# Patient Record
Sex: FEMALE | Race: WHITE | NOT HISPANIC OR LATINO | Employment: FULL TIME | URBAN - NONMETROPOLITAN AREA
[De-identification: names, ages, dates, MRNs, and addresses within clinical notes are randomized per-mention and may not be internally consistent; named-entity substitution may affect disease eponyms.]

---

## 2020-08-13 ENCOUNTER — TELEPHONE (OUTPATIENT)
Dept: OBSTETRICS AND GYNECOLOGY | Facility: CLINIC | Age: 70
End: 2020-08-13

## 2020-08-13 ENCOUNTER — PROCEDURE VISIT (OUTPATIENT)
Dept: OBSTETRICS AND GYNECOLOGY | Facility: CLINIC | Age: 70
End: 2020-08-13

## 2020-08-13 VITALS
BODY MASS INDEX: 29.06 KG/M2 | WEIGHT: 164 LBS | HEIGHT: 63 IN | SYSTOLIC BLOOD PRESSURE: 138 MMHG | DIASTOLIC BLOOD PRESSURE: 82 MMHG

## 2020-08-13 DIAGNOSIS — Z12.11 SCREEN FOR COLON CANCER: ICD-10-CM

## 2020-08-13 DIAGNOSIS — Z01.419 ENCOUNTER FOR GYNECOLOGICAL EXAMINATION WITHOUT ABNORMAL FINDING: Primary | ICD-10-CM

## 2020-08-13 DIAGNOSIS — N95.1 MENOPAUSAL STATE: ICD-10-CM

## 2020-08-13 DIAGNOSIS — Z12.31 ENCOUNTER FOR SCREENING MAMMOGRAM FOR MALIGNANT NEOPLASM OF BREAST: ICD-10-CM

## 2020-08-13 DIAGNOSIS — Z13.820 ENCOUNTER FOR SCREENING FOR OSTEOPOROSIS: ICD-10-CM

## 2020-08-13 PROCEDURE — 99387 INIT PM E/M NEW PAT 65+ YRS: CPT | Performed by: NURSE PRACTITIONER

## 2020-08-13 RX ORDER — ALENDRONATE SODIUM 70 MG/1
70 TABLET ORAL
Qty: 4 TABLET | Refills: 4 | Status: SHIPPED | OUTPATIENT
Start: 2020-08-13 | End: 2020-12-17

## 2020-08-13 NOTE — TELEPHONE ENCOUNTER
----- Message from MAXIMO Joy sent at 8/13/2020  1:02 PM CDT -----  Spine and hips are very close to osteoporosis. She needs to start on Fosamax 35mg once per week for prevention. This may cause heartburn so she needs to take it first thing in the morning (separate from all other medications and supplements) and stay sitting upright for at least 30 minutes. Report any bone pain immediately. Also needs to start on an OTC Calcium + vitamin D supplement daily. Next DEXA will be due in 2 years.

## 2020-08-14 PROBLEM — N95.1 MENOPAUSAL STATE: Status: ACTIVE | Noted: 2020-08-14

## 2020-08-14 NOTE — PROGRESS NOTES
Subjective   Cora Burton is a 69 y.o. presents for GYN exam. No complaints or concerns at this time.     LMP ~ 2005; denies any hx of PMB  Last pap- ; no hx of abnormal  Last mammo- 2019; no hx of abnormal  Last colonoscopy ~10 years ago, normal  Last DEXA-  normal    Gynecologic Exam   The patient's pertinent negatives include no genital itching, genital lesions, genital odor, genital rash, pelvic pain, vaginal bleeding or vaginal discharge. Pertinent negatives include no abdominal pain, constipation, diarrhea, dysuria, nausea or vomiting. She is not sexually active. She uses nothing for contraception. She is postmenopausal. There is no history of an abdominal surgery, a  section, an ectopic pregnancy, endometriosis, a gynecological surgery, herpes simplex, menorrhagia, metrorrhagia, miscarriage, ovarian cysts, perineal abscess, PID, an STD, a terminated pregnancy or vaginosis.       The following portions of the patient's history were reviewed and updated as appropriate: allergies, current medications, past family history, past medical history, past social history, past surgical history and problem list.    Review of Systems   Constitutional: Negative for activity change, appetite change, diaphoresis, fatigue, unexpected weight gain and unexpected weight loss.   Respiratory: Negative for chest tightness and shortness of breath.    Cardiovascular: Negative for chest pain and palpitations.   Gastrointestinal: Negative for abdominal distention, abdominal pain, constipation, diarrhea, nausea, rectal pain, vomiting, GERD and indigestion.   Endocrine: Negative.    Genitourinary: Negative for breast discharge, breast lump, breast pain, decreased urine volume, dysuria, menorrhagia, pelvic pain, pelvic pressure, urinary incontinence, vaginal bleeding, vaginal discharge and vaginal pain.   Musculoskeletal: Negative for myalgias.   Skin: Negative for color change, dry skin and skin lesions.   Neurological:  Negative for light-headedness and headache.   Psychiatric/Behavioral: Negative for agitation, dysphoric mood, sleep disturbance, depressed mood and stress. The patient is not nervous/anxious.        Objective   Physical Exam   Constitutional: She is oriented to person, place, and time. She appears well-developed and well-nourished. No distress.   Neck: No thyroid mass and no thyromegaly present.   Cardiovascular: Normal rate, regular rhythm and normal heart sounds.   Pulmonary/Chest: Effort normal and breath sounds normal. Right breast exhibits no inverted nipple, no mass, no nipple discharge, no skin change and no tenderness. Left breast exhibits no inverted nipple, no mass, no nipple discharge, no skin change and no tenderness. No breast swelling or bleeding. Breasts are symmetrical.   Genitourinary: No breast swelling or bleeding.   Genitourinary Comments: Pap smear not indicated. No GYN complaints. Pelvic exam deferred.    Neurological: She is alert and oriented to person, place, and time.   Skin: Skin is warm, dry and intact. She is not diaphoretic.   Psychiatric: She has a normal mood and affect. Her behavior is normal.   Nursing note and vitals reviewed.        Assessment/Plan   Cora was seen today for gynecologic exam.    Diagnoses and all orders for this visit:    Encounter for gynecological examination without abnormal finding    Encounter for screening mammogram for malignant neoplasm of breast  -     Mammo Screening Digital Tomosynthesis Bilateral With CAD    Encounter for screening for osteoporosis  -     DEXA Bone Density Axial; Future    Menopausal state  -     Ambulatory Referral to Gastroenterology    Screen for colon cancer  -     Ambulatory Referral to Gastroenterology      Pt no longer needs routine cervical cancer screening; we discussed this today and she is content with this information. Screening mammogram today. Pt to see GI for colonoscopy at next available time.

## 2020-08-27 ENCOUNTER — OFFICE VISIT (OUTPATIENT)
Dept: GASTROENTEROLOGY | Facility: CLINIC | Age: 70
End: 2020-08-27

## 2020-08-27 VITALS
HEIGHT: 63 IN | SYSTOLIC BLOOD PRESSURE: 155 MMHG | HEART RATE: 83 BPM | BODY MASS INDEX: 29.2 KG/M2 | WEIGHT: 164.8 LBS | DIASTOLIC BLOOD PRESSURE: 76 MMHG

## 2020-08-27 DIAGNOSIS — Z12.11 ENCOUNTER FOR SCREENING FOR MALIGNANT NEOPLASM OF COLON: Primary | ICD-10-CM

## 2020-08-27 PROCEDURE — S0285 CNSLT BEFORE SCREEN COLONOSC: HCPCS | Performed by: NURSE PRACTITIONER

## 2020-08-27 RX ORDER — DEXTROSE AND SODIUM CHLORIDE 5; .45 G/100ML; G/100ML
30 INJECTION, SOLUTION INTRAVENOUS CONTINUOUS PRN
Status: CANCELLED | OUTPATIENT
Start: 2020-09-08

## 2020-08-27 RX ORDER — SODIUM, POTASSIUM,MAG SULFATES 17.5-3.13G
1 SOLUTION, RECONSTITUTED, ORAL ORAL EVERY 12 HOURS
Qty: 2 BOTTLE | Refills: 0 | Status: SHIPPED | OUTPATIENT
Start: 2020-08-27 | End: 2020-09-14

## 2020-08-27 NOTE — PROGRESS NOTES
Chief Complaint   Patient presents with   • Colon Cancer Screening       Subjective    Cora Burton is a 69 y.o. female. she is here today for follow-up.    History of Present Illness  69-year-old female presents to discuss screening colonoscopy.  She denies any abdominal pain, nausea, vomiting or change within her bowel habits.  Denies any melena or hematochezia.  Weight is stable.  Previous colonoscopy in  per surgeon Dr. Bernard in [place] unable to review results she thinks she may have had polyps that she is unsure she denies any family history of colorectal cancer.       The following portions of the patient's history were reviewed and updated as appropriate:   No past medical history on file.  No past surgical history on file.  Family History   Problem Relation Age of Onset   • Breast cancer Maternal Aunt      OB History        3    Para   3    Term   3            AB        Living   3       SAB        TAB        Ectopic        Molar        Multiple        Live Births   3              Prior to Admission medications    Medication Sig Start Date End Date Taking? Authorizing Provider   alendronate (Fosamax) 70 MG tablet Take 1 tablet by mouth Every 7 (Seven) Days. 20   Pamela Gray, APRN     No Known Allergies  Social History     Socioeconomic History   • Marital status:      Spouse name: Not on file   • Number of children: Not on file   • Years of education: Not on file   • Highest education level: Not on file   Tobacco Use   • Smoking status: Never Smoker   • Smokeless tobacco: Never Used   Substance and Sexual Activity   • Alcohol use: Never     Frequency: Never   • Drug use: Never   • Sexual activity: Not Currently       Review of Systems  Review of Systems   Constitutional: Negative for activity change, appetite change, chills, diaphoresis, fatigue, fever and unexpected weight change.   HENT: Negative for sore throat and trouble swallowing.    Respiratory: Negative for  "shortness of breath.    Gastrointestinal: Negative for abdominal distention, abdominal pain, anal bleeding, blood in stool, constipation, diarrhea, nausea, rectal pain and vomiting.   Musculoskeletal: Negative for arthralgias.   Skin: Negative for pallor.   Neurological: Negative for light-headedness.        /76 (BP Location: Left arm)   Pulse 83   Ht 158.8 cm (62.5\")   Wt 74.8 kg (164 lb 12.8 oz)   BMI 29.66 kg/m²     Objective    Physical Exam   Constitutional: She is oriented to person, place, and time. She appears well-developed and well-nourished. She is cooperative. No distress.   HENT:   Head: Normocephalic and atraumatic.   Neck: Normal range of motion. Neck supple. No thyromegaly present.   Cardiovascular: Normal rate, regular rhythm and normal heart sounds.   Pulmonary/Chest: Effort normal and breath sounds normal. She has no wheezes. She has no rhonchi. She has no rales.   Abdominal: Soft. Normal appearance and bowel sounds are normal. She exhibits no shifting dullness, no distension, no fluid wave and no ascites. There is no hepatosplenomegaly. There is no tenderness. There is no rigidity and no guarding. No hernia.   Lymphadenopathy:     She has no cervical adenopathy.   Neurological: She is alert and oriented to person, place, and time.   Skin: Skin is warm, dry and intact. No rash noted. No pallor.   Psychiatric: She has a normal mood and affect. Her speech is normal.     No results found for any previous visit.     Assessment/Plan      1. Encounter for screening for malignant neoplasm of colon    .   Schedule patient for colonoscopy.  Follow-up after test return office sooner if needed    Orders placed during this encounter include:  Orders Placed This Encounter   Procedures   • Follow Anesthesia Guidelines / Standing Orders     Standing Status:   Future   • Obtain Informed Consent     Standing Status:   Future     Order Specific Question:   Informed Consent Given For     Answer:   " colonoscopy       COLONOSCOPY (N/A)    Review and/or summary of lab tests, radiology, procedures, medications. Review and summary of old records and obtaining of history. The risks and benefits of my recommendations, as well as other treatment options were discussed with the patient today. Questions were answered.    New Medications Ordered This Visit   Medications   • sodium-potassium-magnesium sulfates (Suprep Bowel Prep Kit) 17.5-3.13-1.6 GM/177ML solution oral solution     Sig: Take 1 bottle by mouth Every 12 (Twelve) Hours.     Dispense:  2 bottle     Refill:  0       Follow-up: Return in about 4 weeks (around 9/24/2020) for Recheck, After test.          This document has been electronically signed by MAXIMO Vazquez on August 27, 2020 14:53             No results found for this or any previous visit.

## 2020-08-28 ENCOUNTER — TRANSCRIBE ORDERS (OUTPATIENT)
Dept: GENERAL RADIOLOGY | Facility: HOSPITAL | Age: 70
End: 2020-08-28

## 2020-08-28 ENCOUNTER — OFFICE VISIT (OUTPATIENT)
Dept: SURGERY | Facility: CLINIC | Age: 70
End: 2020-08-28

## 2020-08-28 VITALS
HEIGHT: 63 IN | HEART RATE: 74 BPM | BODY MASS INDEX: 30.16 KG/M2 | SYSTOLIC BLOOD PRESSURE: 122 MMHG | WEIGHT: 170.2 LBS | DIASTOLIC BLOOD PRESSURE: 70 MMHG | TEMPERATURE: 97 F

## 2020-08-28 DIAGNOSIS — Z01.818 PRE-OP TESTING: Primary | ICD-10-CM

## 2020-08-28 DIAGNOSIS — R92.8 ABNORMAL MAMMOGRAM OF LEFT BREAST: Primary | ICD-10-CM

## 2020-08-28 PROCEDURE — 99203 OFFICE O/P NEW LOW 30 MIN: CPT | Performed by: SURGERY

## 2020-08-28 RX ORDER — OXYCODONE HYDROCHLORIDE AND ACETAMINOPHEN 5; 325 MG/1; MG/1
1 TABLET ORAL ONCE
Status: CANCELLED | OUTPATIENT
Start: 2020-08-28 | End: 2020-08-28

## 2020-08-28 RX ORDER — LIDOCAINE HYDROCHLORIDE AND EPINEPHRINE 10; 10 MG/ML; UG/ML
20 INJECTION, SOLUTION INFILTRATION; PERINEURAL ONCE
Status: CANCELLED | OUTPATIENT
Start: 2020-08-28 | End: 2020-08-28

## 2020-08-28 RX ORDER — DIAZEPAM 5 MG/1
5 TABLET ORAL ONCE
Status: CANCELLED | OUTPATIENT
Start: 2020-08-28 | End: 2020-08-28

## 2020-09-05 ENCOUNTER — LAB (OUTPATIENT)
Dept: LAB | Facility: HOSPITAL | Age: 70
End: 2020-09-05

## 2020-09-05 PROCEDURE — U0003 INFECTIOUS AGENT DETECTION BY NUCLEIC ACID (DNA OR RNA); SEVERE ACUTE RESPIRATORY SYNDROME CORONAVIRUS 2 (SARS-COV-2) (CORONAVIRUS DISEASE [COVID-19]), AMPLIFIED PROBE TECHNIQUE, MAKING USE OF HIGH THROUGHPUT TECHNOLOGIES AS DESCRIBED BY CMS-2020-01-R: HCPCS | Performed by: INTERNAL MEDICINE

## 2020-09-05 PROCEDURE — C9803 HOPD COVID-19 SPEC COLLECT: HCPCS

## 2020-09-05 PROCEDURE — C9803 HOPD COVID-19 SPEC COLLECT: HCPCS | Performed by: INTERNAL MEDICINE

## 2020-09-06 LAB
COVID LABCORP PRIORITY: NORMAL
SARS-COV-2 RNA RESP QL NAA+PROBE: NOT DETECTED

## 2020-09-08 ENCOUNTER — ANESTHESIA EVENT (OUTPATIENT)
Dept: GASTROENTEROLOGY | Facility: HOSPITAL | Age: 70
End: 2020-09-08

## 2020-09-08 ENCOUNTER — ANESTHESIA (OUTPATIENT)
Dept: GASTROENTEROLOGY | Facility: HOSPITAL | Age: 70
End: 2020-09-08

## 2020-09-08 ENCOUNTER — HOSPITAL ENCOUNTER (OUTPATIENT)
Facility: HOSPITAL | Age: 70
Setting detail: HOSPITAL OUTPATIENT SURGERY
Discharge: HOME | End: 2020-09-08
Attending: INTERNAL MEDICINE | Admitting: INTERNAL MEDICINE

## 2020-09-08 VITALS
HEIGHT: 62 IN | DIASTOLIC BLOOD PRESSURE: 70 MMHG | OXYGEN SATURATION: 99 % | BODY MASS INDEX: 30 KG/M2 | SYSTOLIC BLOOD PRESSURE: 127 MMHG | HEART RATE: 77 BPM | RESPIRATION RATE: 18 BRPM | WEIGHT: 163 LBS | TEMPERATURE: 97.1 F

## 2020-09-08 DIAGNOSIS — Z12.11 ENCOUNTER FOR SCREENING FOR MALIGNANT NEOPLASM OF COLON: ICD-10-CM

## 2020-09-08 PROCEDURE — 45378 DIAGNOSTIC COLONOSCOPY: CPT | Performed by: INTERNAL MEDICINE

## 2020-09-08 PROCEDURE — 25010000002 PROPOFOL 10 MG/ML EMULSION: Performed by: NURSE ANESTHETIST, CERTIFIED REGISTERED

## 2020-09-08 RX ORDER — LIDOCAINE HYDROCHLORIDE 20 MG/ML
INJECTION, SOLUTION INTRAVENOUS AS NEEDED
Status: DISCONTINUED | OUTPATIENT
Start: 2020-09-08 | End: 2020-09-08 | Stop reason: SURG

## 2020-09-08 RX ORDER — PROPOFOL 10 MG/ML
VIAL (ML) INTRAVENOUS AS NEEDED
Status: DISCONTINUED | OUTPATIENT
Start: 2020-09-08 | End: 2020-09-08 | Stop reason: SURG

## 2020-09-08 RX ORDER — DEXTROSE AND SODIUM CHLORIDE 5; .45 G/100ML; G/100ML
30 INJECTION, SOLUTION INTRAVENOUS CONTINUOUS PRN
Status: DISCONTINUED | OUTPATIENT
Start: 2020-09-08 | End: 2020-09-08 | Stop reason: HOSPADM

## 2020-09-08 RX ADMIN — DEXTROSE AND SODIUM CHLORIDE 30 ML/HR: 5; 450 INJECTION, SOLUTION INTRAVENOUS at 13:37

## 2020-09-08 RX ADMIN — PROPOFOL 100 MG: 10 INJECTION, EMULSION INTRAVENOUS at 14:38

## 2020-09-08 RX ADMIN — PROPOFOL 50 MG: 10 INJECTION, EMULSION INTRAVENOUS at 14:40

## 2020-09-08 RX ADMIN — PROPOFOL 50 MG: 10 INJECTION, EMULSION INTRAVENOUS at 14:44

## 2020-09-08 RX ADMIN — LIDOCAINE HYDROCHLORIDE 100 MG: 20 INJECTION, SOLUTION INTRAVENOUS at 14:38

## 2020-09-08 NOTE — ANESTHESIA POSTPROCEDURE EVALUATION
Patient: Cora Burton    Procedure Summary     Date:  09/08/20 Room / Location:  [facility] ENDOSCOPY 1 / [facility] ENDOSCOPY    Anesthesia Start:  1429 Anesthesia Stop:  1450    Procedure:  COLONOSCOPY (N/A ) Diagnosis:       Encounter for screening for malignant neoplasm of colon      (Encounter for screening for malignant neoplasm of colon [Z12.11])    Surgeon:  Brit Esparza MD Provider:  Dianna Alvarez CRNA    Anesthesia Type:  MAC ASA Status:  2          Anesthesia Type: MAC    Vitals  No vitals data found for the desired time range.          Post Anesthesia Care and Evaluation    Patient location during evaluation: bedside  Patient participation: waiting for patient participation  Level of consciousness: sleepy but conscious  Pain score: 0  Pain management: adequate  Airway patency: patent  Anesthetic complications: No anesthetic complications  PONV Status: none  Cardiovascular status: acceptable  Respiratory status: acceptable  Hydration status: acceptable

## 2020-09-08 NOTE — ANESTHESIA PREPROCEDURE EVALUATION
Anesthesia Evaluation     Patient summary reviewed and Nursing notes reviewed   NPO Solid Status: > 8 hours  NPO Liquid Status: > 4 hours           Airway   Mallampati: III  TM distance: <3 FB  Neck ROM: full  Difficult intubation highly probable, Small opening and Narrow palate  Dental      Comment: Prominent teeth    Pulmonary - negative pulmonary ROS and normal exam   Cardiovascular - negative cardio ROS and normal exam        Neuro/Psych- negative ROS  GI/Hepatic/Renal/Endo - negative ROS     Musculoskeletal (-) negative ROS    Abdominal  - normal exam   Substance History - negative use     OB/GYN negative ob/gyn ROS         Other                        Anesthesia Plan    ASA 2     MAC     intravenous induction     Anesthetic plan, all risks, benefits, and alternatives have been provided, discussed and informed consent has been obtained with: patient.

## 2020-09-09 NOTE — H&P (VIEW-ONLY)
CHIEF COMPLAINT:    Abnormal mammogram of left breast    HISTORY OF PRESENT ILLNESS:    Cora Burton is a 69 y.o. female who presents after an abnormal, BI-RADS 4 mammogram of the left breast.  She has never had a prior abnormal mammogram nor has she had prior breast biopsies or other procedures.  She notes no family history of breast cancer.  She notes no breast related changes or complaints.    Menarche was at age 15, menopause was at age 54.  She is .  She denies any prior hormone use.    Her recent mammogram showed 2 new clusters of calcifications in the left upper outer quadrant of the breast.    History reviewed. No pertinent past medical history.    Past Surgical History:   Procedure Laterality Date   • COLONOSCOPY N/A 2020    Procedure: COLONOSCOPY;  Surgeon: Brit Esparza MD;  Location: [facility] ENDOSCOPY;  Service: Gastroenterology;  Laterality: N/A;       Prior to Admission medications    Medication Sig Start Date End Date Taking? Authorizing Provider   alendronate (Fosamax) 70 MG tablet Take 1 tablet by mouth Every 7 (Seven) Days. 20  Yes Pamela Gray APRN   sodium-potassium-magnesium sulfates (Suprep Bowel Prep Kit) 17.5-3.13-1.6 GM/177ML solution oral solution Take 1 bottle by mouth Every 12 (Twelve) Hours. 20   Violeta Saxena APRN       No Known Allergies    Family History   Problem Relation Age of Onset   • Breast cancer Maternal Aunt        Social History     Socioeconomic History   • Marital status:      Spouse name: Not on file   • Number of children: Not on file   • Years of education: Not on file   • Highest education level: Not on file   Tobacco Use   • Smoking status: Never Smoker   • Smokeless tobacco: Never Used   Substance and Sexual Activity   • Alcohol use: Never     Frequency: Never   • Drug use: Never   • Sexual activity: Not Currently       Review of Systems   Constitutional: Negative for appetite change, chills, fever and unexpected weight change.   "  HENT: Negative for hearing loss, nosebleeds and trouble swallowing.    Eyes: Negative for visual disturbance.   Respiratory: Negative for apnea, cough, choking, chest tightness, shortness of breath, wheezing and stridor.    Cardiovascular: Negative for chest pain, palpitations and leg swelling.   Gastrointestinal: Negative for abdominal distention, abdominal pain, blood in stool, constipation, diarrhea, nausea and vomiting.   Endocrine: Negative for cold intolerance, heat intolerance, polydipsia, polyphagia and polyuria.   Genitourinary: Negative for difficulty urinating, dysuria, frequency, hematuria and urgency.   Musculoskeletal: Negative for arthralgias, back pain, myalgias and neck pain.   Skin: Negative for color change, pallor and rash.   Allergic/Immunologic: Negative for immunocompromised state.   Neurological: Negative for dizziness, seizures, syncope, light-headedness, numbness and headaches.   Hematological: Negative for adenopathy.   Psychiatric/Behavioral: Negative for suicidal ideas. The patient is not nervous/anxious.        Objective     /70   Pulse 74   Temp 97 °F (36.1 °C) (Temporal)   Ht 158.8 cm (62.5\")   Wt 77.2 kg (170 lb 3.2 oz)   BMI 30.63 kg/m²     Physical Exam   Constitutional: She is oriented to person, place, and time. She appears well-developed and well-nourished. No distress.   HENT:   Head: Normocephalic and atraumatic.   Eyes: Conjunctivae and EOM are normal. Right eye exhibits no discharge. Left eye exhibits no discharge. No scleral icterus.   Neck: No tracheal deviation present.   Cardiovascular: Normal rate and regular rhythm.   Pulmonary/Chest: Effort normal. No respiratory distress. Right breast exhibits no inverted nipple, no mass, no nipple discharge, no skin change and no tenderness. Left breast exhibits no inverted nipple, no mass, no nipple discharge, no skin change and no tenderness.   Lymphadenopathy:     She has no axillary adenopathy.        Right: No " supraclavicular adenopathy present.        Left: No supraclavicular adenopathy present.   Neurological: She is alert and oriented to person, place, and time.   Skin: Skin is warm. She is not diaphoretic.   Psychiatric: She has a normal mood and affect. Her behavior is normal. Judgment and thought content normal.       DIAGNOSTIC DATA:    Mammographic images were reviewed showing areas of calcification in the upper outer left breast for which biopsy was recommended by Dr. Edwards.    ASSESSMENT:    Abnormal mammogram of left breast    PLAN:    The risks and benefits of stereotactic left breast biopsy with clip placement were discussed and the patient is agreeable with proceeding.  This has been scheduled for 9/14/2020.          This document has been electronically signed by Sabas Lau MD on September 9, 2020 13:38

## 2020-09-11 ENCOUNTER — LAB (OUTPATIENT)
Dept: LAB | Facility: HOSPITAL | Age: 70
End: 2020-09-11

## 2020-09-11 DIAGNOSIS — Z01.818 PRE-OP TESTING: ICD-10-CM

## 2020-09-11 PROCEDURE — U0003 INFECTIOUS AGENT DETECTION BY NUCLEIC ACID (DNA OR RNA); SEVERE ACUTE RESPIRATORY SYNDROME CORONAVIRUS 2 (SARS-COV-2) (CORONAVIRUS DISEASE [COVID-19]), AMPLIFIED PROBE TECHNIQUE, MAKING USE OF HIGH THROUGHPUT TECHNOLOGIES AS DESCRIBED BY CMS-2020-01-R: HCPCS

## 2020-09-11 PROCEDURE — C9803 HOPD COVID-19 SPEC COLLECT: HCPCS

## 2020-09-12 LAB
COVID LABCORP PRIORITY: NORMAL
SARS-COV-2 RNA RESP QL NAA+PROBE: NOT DETECTED

## 2020-09-14 ENCOUNTER — HOSPITAL ENCOUNTER (OUTPATIENT)
Dept: MAMMOGRAPHY | Facility: HOSPITAL | Age: 70
Discharge: HOME | End: 2020-09-14

## 2020-09-14 VITALS
DIASTOLIC BLOOD PRESSURE: 61 MMHG | TEMPERATURE: 96.7 F | HEIGHT: 62 IN | WEIGHT: 163.36 LBS | SYSTOLIC BLOOD PRESSURE: 140 MMHG | OXYGEN SATURATION: 98 % | HEART RATE: 86 BPM | BODY MASS INDEX: 30.06 KG/M2 | RESPIRATION RATE: 16 BRPM

## 2020-09-14 DIAGNOSIS — R92.8 ABNORMAL MAMMOGRAM OF LEFT BREAST: ICD-10-CM

## 2020-09-14 DIAGNOSIS — R92.8 ABNORMAL MAMMOGRAM: ICD-10-CM

## 2020-09-14 PROCEDURE — 19081 BX BREAST 1ST LESION STRTCTC: CPT | Performed by: SURGERY

## 2020-09-14 PROCEDURE — A4648 IMPLANTABLE TISSUE MARKER: HCPCS

## 2020-09-14 PROCEDURE — 19082 BX BREAST ADD LESION STRTCTC: CPT | Performed by: SURGERY

## 2020-09-14 RX ORDER — DIAZEPAM 5 MG/1
5 TABLET ORAL ONCE
Status: COMPLETED | OUTPATIENT
Start: 2020-09-14 | End: 2020-09-14

## 2020-09-14 RX ORDER — OXYCODONE HYDROCHLORIDE AND ACETAMINOPHEN 5; 325 MG/1; MG/1
1 TABLET ORAL ONCE
Status: COMPLETED | OUTPATIENT
Start: 2020-09-14 | End: 2020-09-14

## 2020-09-14 RX ORDER — LIDOCAINE HYDROCHLORIDE AND EPINEPHRINE 10; 10 MG/ML; UG/ML
20 INJECTION, SOLUTION INFILTRATION; PERINEURAL ONCE
Status: COMPLETED | OUTPATIENT
Start: 2020-09-14 | End: 2020-09-14

## 2020-09-14 RX ADMIN — OXYCODONE HYDROCHLORIDE AND ACETAMINOPHEN 1 TABLET: 5; 325 TABLET ORAL at 06:05

## 2020-09-14 RX ADMIN — LIDOCAINE HYDROCHLORIDE,EPINEPHRINE BITARTRATE 20 ML: 10; .01 INJECTION, SOLUTION INFILTRATION; PERINEURAL at 08:19

## 2020-09-14 RX ADMIN — DIAZEPAM 5 MG: 5 TABLET ORAL at 06:05

## 2020-09-14 RX ADMIN — LIDOCAINE HYDROCHLORIDE,EPINEPHRINE BITARTRATE 20 ML: 10; .01 INJECTION, SOLUTION INFILTRATION; PERINEURAL at 08:18

## 2020-09-14 NOTE — INTERVAL H&P NOTE
H&P reviewed. The patient was examined and there are no changes to the H&P.   Two areas to be biopsied.          This document has been electronically signed by Sabas Lau MD on September 14, 2020 07:01 CDT

## 2020-09-15 LAB
LAB AP CASE REPORT: NORMAL
PATH REPORT.FINAL DX SPEC: NORMAL

## 2020-09-17 ENCOUNTER — OFFICE VISIT (OUTPATIENT)
Dept: SURGERY | Facility: CLINIC | Age: 70
End: 2020-09-17

## 2020-09-17 VITALS
SYSTOLIC BLOOD PRESSURE: 122 MMHG | WEIGHT: 164 LBS | DIASTOLIC BLOOD PRESSURE: 76 MMHG | HEIGHT: 62 IN | TEMPERATURE: 97.5 F | HEART RATE: 74 BPM | BODY MASS INDEX: 30.18 KG/M2

## 2020-09-17 DIAGNOSIS — R92.8 ABNORMAL MAMMOGRAM: Primary | ICD-10-CM

## 2020-09-17 DIAGNOSIS — Z09 FOLLOW UP: ICD-10-CM

## 2020-09-17 PROCEDURE — 99024 POSTOP FOLLOW-UP VISIT: CPT | Performed by: SURGERY

## 2020-09-17 NOTE — PROGRESS NOTES
CHIEF COMPLAINT:    Chief Complaint   Patient presents with   • Follow-up     Left breast Stereotactic Mammotome 9-       HISTORY OF PRESENT ILLNESS:    Cora Burton is a 69 y.o. female who underwent to left breast stereotactic biopsies.  Both sites were found to be benign fibroadenomatoid lesions with calcifications present.  This benign result was discussed with the patient today.  She notes no significant complaints following the biopsy.    EXAM:  Vitals:    09/17/20 0941   BP: 122/76   Pulse: 74   Temp: 97.5 °F (36.4 °C)         Healing biopsy sites on lower left breast.    ASSESSMENT:    Status post benign left breast biopsy    PLAN:    Follow-up in 3 months with repeat mammogram.          This document has been electronically signed by Sabas Lau MD on September 17, 2020 10:01 CDT

## 2020-12-15 ENCOUNTER — OFFICE VISIT (OUTPATIENT)
Dept: SURGERY | Facility: CLINIC | Age: 70
End: 2020-12-15

## 2020-12-15 DIAGNOSIS — Z12.31 VISIT FOR SCREENING MAMMOGRAM: Primary | ICD-10-CM

## 2020-12-15 DIAGNOSIS — Z09 FOLLOW UP: Primary | ICD-10-CM

## 2020-12-15 NOTE — PROGRESS NOTES
You have chosen to receive care through a telephone visit. Do you consent to use a telephone visit for your medical care today? Yes    2 minutes spent on phone    CHIEF COMPLAINT:    Chief Complaint   Patient presents with   • Follow-up     Mammogram results.       HISTORY OF PRESENT ILLNESS:    Cora Burton is a 70 y.o. female who underwent repeat mammogram for 3-month follow-up of a prior benign set of biopsies.  Her current imaging showed BI-RADS 2 findings.  In speaking on the phone she notes no new breast related complaints.    EXAM:  There were no vitals filed for this visit.      No exam due to telephone visit    ASSESSMENT:    Benign imaging following prior benign breast biopsies    PLAN:    We will schedule her for her annual mammogram.          This document has been electronically signed by Sabas Lau MD on December 15, 2020 11:30 CST

## 2020-12-17 RX ORDER — ALENDRONATE SODIUM 70 MG/1
TABLET ORAL
Qty: 12 TABLET | Refills: 4 | Status: SHIPPED | OUTPATIENT
Start: 2020-12-17 | End: 2021-12-06

## 2021-03-01 ENCOUNTER — PATIENT MESSAGE (OUTPATIENT)
Dept: OBSTETRICS AND GYNECOLOGY | Facility: CLINIC | Age: 71
End: 2021-03-01

## 2021-03-01 NOTE — TELEPHONE ENCOUNTER
From: Cora Burton  To: MAXIMO Basilio  Sent: 3/1/2021 3:50 PM CST  Subject: Non-Urgent Medical Question    Can I schedule a COVID-19 vaccine through you?

## 2021-08-26 ENCOUNTER — OFFICE VISIT (OUTPATIENT)
Dept: SURGERY | Facility: CLINIC | Age: 71
End: 2021-08-26

## 2021-08-26 VITALS
DIASTOLIC BLOOD PRESSURE: 86 MMHG | BODY MASS INDEX: 30.44 KG/M2 | HEART RATE: 77 BPM | OXYGEN SATURATION: 98 % | TEMPERATURE: 97.9 F | HEIGHT: 62 IN | SYSTOLIC BLOOD PRESSURE: 150 MMHG | WEIGHT: 165.4 LBS

## 2021-08-26 DIAGNOSIS — Z09 FOLLOW UP: Primary | ICD-10-CM

## 2021-08-26 DIAGNOSIS — Z12.31 SCREENING MAMMOGRAM, ENCOUNTER FOR: ICD-10-CM

## 2021-08-26 PROCEDURE — 99213 OFFICE O/P EST LOW 20 MIN: CPT | Performed by: SURGERY

## 2021-08-26 NOTE — PROGRESS NOTES
CHIEF COMPLAINT:    Chief Complaint   Patient presents with   • Follow-up     breast exam and mammo results from 8/17       HISTORY OF PRESENT ILLNESS:    Cora Burton is a 70 y.o. female who underwent repeat mammogram for screening purposes.  She previously has undergone benign breast biopsies.  Her current imaging is benign as well.  She has no breast related complaints today.    EXAM:  Vitals:    08/26/21 1428   BP: 150/86   Pulse: 77   Temp: 97.9 °F (36.6 °C)   SpO2: 98%         Benign bilateral breast exam    ASSESSMENT:    Benign prior biopsy of the breast, benign imaging    PLAN:    Continue annual screening mammogram.  Follow-up in a year.          This document has been electronically signed by Sabas Lau MD on August 26, 2021 14:50 CDT

## 2021-08-26 NOTE — PATIENT INSTRUCTIONS
"BMI for Adults  What is BMI?  Body mass index (BMI) is a number that is calculated from a person's weight and height. BMI can help estimate how much of a person's weight is composed of fat. BMI does not measure body fat directly. Rather, it is an alternative to procedures that directly measure body fat, which can be difficult and expensive.  BMI can help identify people who may be at higher risk for certain medical problems.  What are BMI measurements used for?  BMI is used as a screening tool to identify possible weight problems. It helps determine whether a person is obese, overweight, a healthy weight, or underweight.  BMI is useful for:  · Identifying a weight problem that may be related to a medical condition or may increase the risk for medical problems.  · Promoting changes, such as changes in diet and exercise, to help reach a healthy weight. BMI screening can be repeated to see if these changes are working.  How is BMI calculated?  BMI involves measuring your weight in relation to your height. Both height and weight are measured, and the BMI is calculated from those numbers. This can be done either in English ([place] or metric measurements. Note that charts and online BMI calculators are available to help you find your BMI quickly and easily without having to do these calculations yourself.  To calculate your BMI in English ([place] measurements:    1. Measure your weight in pounds (lb).  2. Multiply the number of pounds by 703.  ? For example, for a person who weighs 180 lb, multiply that number by 703, which equals 126,540.  3. Measure your height in inches. Then multiply that number by itself to get a measurement called \"inches squared.\"  ? For example, for a person who is 70 inches tall, the \"inches squared\" measurement is 70 inches x 70 inches, which equals 4,900 inches squared.  4. Divide the total from step 2 (number of lb x 703) by the total from step 3 (inches squared): 126,540 ÷ 4,900 = 25.8. This is " "your BMI.  To calculate your BMI in metric measurements:  1. Measure your weight in kilograms (kg).  2. Measure your height in meters (m). Then multiply that number by itself to get a measurement called \"meters squared.\"  ? For example, for a person who is 1.75 m tall, the \"meters squared\" measurement is 1.75 m x 1.75 m, which is equal to 3.1 meters squared.  3. Divide the number of kilograms (your weight) by the meters squared number. In this example: 70 ÷ 3.1 = 22.6. This is your BMI.  What do the results mean?  BMI charts are used to identify whether you are underweight, normal weight, overweight, or obese. The following guidelines will be used:  · Underweight: BMI less than 18.5.  · Normal weight: BMI between 18.5 and 24.9.  · Overweight: BMI between 25 and 29.9.  · Obese: BMI of 30 or above.  Keep these notes in mind:  · Weight includes both fat and muscle, so someone with a muscular build, such as an athlete, may have a BMI that is higher than 24.9. In cases like these, BMI is not an accurate measure of body fat.  · To determine if excess body fat is the cause of a BMI of 25 or higher, further assessments may need to be done by a health care provider.  · BMI is usually interpreted in the same way for men and women.  Where to find more information  For more information about BMI, including tools to quickly calculate your BMI, go to these websites:  · Centers for Disease Control and Prevention: www.cdc.gov  · American Heart Association: www.heart.org  · National Heart, Lung, and [employer]: www.nhlbi.nih.gov  Summary  · Body mass index (BMI) is a number that is calculated from a person's weight and height.  · BMI may help estimate how much of a person's weight is composed of fat. BMI can help identify those who may be at higher risk for certain medical problems.  · BMI can be measured using English measurements or metric measurements.  · BMI charts are used to identify whether you are underweight, normal " weight, overweight, or obese.  This information is not intended to replace advice given to you by your health care provider. Make sure you discuss any questions you have with your health care provider.  Document Revised: 09/09/2020 Document Reviewed: 07/17/2020  [employer]vier Patient Education © 2021 [employer]

## 2021-09-09 DIAGNOSIS — Z12.31 SCREENING MAMMOGRAM, ENCOUNTER FOR: Primary | ICD-10-CM

## 2021-12-06 RX ORDER — ALENDRONATE SODIUM 70 MG/1
TABLET ORAL
Qty: 12 TABLET | Refills: 4 | Status: SHIPPED | OUTPATIENT
Start: 2021-12-06 | End: 2022-12-19

## 2022-08-29 ENCOUNTER — OFFICE VISIT (OUTPATIENT)
Dept: SURGERY | Facility: CLINIC | Age: 72
End: 2022-08-29

## 2022-08-29 VITALS
TEMPERATURE: 98 F | SYSTOLIC BLOOD PRESSURE: 150 MMHG | HEIGHT: 62 IN | DIASTOLIC BLOOD PRESSURE: 78 MMHG | BODY MASS INDEX: 26.72 KG/M2 | HEART RATE: 84 BPM | OXYGEN SATURATION: 100 % | WEIGHT: 145.2 LBS

## 2022-08-29 DIAGNOSIS — Z12.31 SCREENING MAMMOGRAM, ENCOUNTER FOR: Primary | ICD-10-CM

## 2022-08-29 PROCEDURE — 99213 OFFICE O/P EST LOW 20 MIN: CPT | Performed by: NURSE PRACTITIONER

## 2022-08-29 NOTE — PROGRESS NOTES
Chief Complaint  Follow-up (Recheck Breast and Mammogram)    Serjio Burton presents to [facility] GENERAL SURGERY     History of Present Illness  Ms. Cora Burton is a 71 year old patient who presents today for annual mammography as well as breast exam. She denies any concerns with her breast including lumps, masses, skin changes, nipple discharge or lymph node enlargement.  She has had benign breast biopsies in the past.  She does have family history of breast cancer in aunt.  She does not take any hormones and is postmenopausal.    Study Result  Narrative & Impression   PROCEDURE: Bilateral diagnostic mammogram with 3-D tomosynthesis   with CAD   REASON FOR EXAM: Abnormal screening mammography   FINDINGS: Comparison study dated 8/22/2022.. Patient presents for   workup of screening mammogram abnormalities. This workup consists   of spot compression imaging rolled left cc views and true lateral   projection right breast. Bilateral breast asymmetric densities   resolve on these additional views and therefore simply   represented superimposed breast parenchyma.   Parenchymal pattern: Scattered fibroglandular densities   IMPRESSION:   No mammographic evidence of malignancy.   BI-RADS category 2: Benign findings.   Recommendation: Annual mammography   Electronically signed by: Thad Edwards MD 8/29/2022 1:57 PM CDT   Workstation: [id number]     Study Result  Narrative & Impression   PROCEDURE: Bilateral digital screening mammogram   HISTORY: Routine screening mammography.   COMPARISON: 8/17/2021 through 4/18/2018   NOTE: Computer-aided detection was utilized during this exam.   Digital breast tomosynthesis was performed.   FINDINGS:   CC and MLO views are obtained of both breasts.   Parenchymal pattern: There are scattered areas of fibroglandular   density.   Focal asymmetry noted in the upper outer right breast, 6.7 cm   from the nipple. Asymmetric density seen in the  "medial left   breast, 6.5 cm from the nipple. Recommend spot compression views   of both breasts and possibly bilateral breast ultrasound.   IMPRESSION:   Focal asymmetry noted in the upper outer right breast, 6.7 cm   from the nipple. Asymmetric density seen in the medial left   breast, 6.5 cm from the nipple. Recommend spot compression views   of both breasts and possibly bilateral breast ultrasound.   BI-RADS Category 0 - Incomplete: Need additional imaging   evaluation and/or prior mammograms for comparison   Electronically signed by: Amadeo Del Rosario MD 8/23/2022 11:28 AM   CDT Workstation: [id number]       Objective   Vital Signs:  /78   Pulse 84   Temp 98 °F (36.7 °C) (Temporal)   Ht 157.5 cm (62\")   Wt 65.9 kg (145 lb 3.2 oz)   SpO2 100%   BMI 26.56 kg/m²   Estimated body mass index is 26.56 kg/m² as calculated from the following:    Height as of this encounter: 157.5 cm (62\").    Weight as of this encounter: 65.9 kg (145 lb 3.2 oz).          Physical Exam  Vitals reviewed.   Constitutional:       General: She is not in acute distress.     Appearance: Normal appearance. She is not ill-appearing, toxic-appearing or diaphoretic.   Cardiovascular:      Rate and Rhythm: Normal rate.   Pulmonary:      Effort: Pulmonary effort is normal. No respiratory distress.   Chest:   Breasts: Breasts are symmetrical.      Right: No swelling, bleeding, inverted nipple, mass, nipple discharge, skin change, tenderness, axillary adenopathy or supraclavicular adenopathy.      Left: No swelling, bleeding, inverted nipple, mass, nipple discharge, skin change, tenderness, axillary adenopathy or supraclavicular adenopathy.       Lymphadenopathy:      Upper Body:      Right upper body: No supraclavicular or axillary adenopathy.      Left upper body: No supraclavicular or axillary adenopathy.   Skin:     General: Skin is warm and dry.   Neurological:      General: No focal deficit present.      Mental Status: She is alert " and oriented to person, place, and time.   Psychiatric:         Mood and Affect: Mood normal.         Behavior: Behavior normal.         Thought Content: Thought content normal.         Judgment: Judgment normal.        BMI is >= 25 and <30. (Overweight) The following options were offered after discussion;: weight loss educational material (shared in after visit summary)      Result Review :      Data reviewed: Radiologic studies Mammography          Assessment and Plan   Diagnoses and all orders for this visit:    1. Screening mammogram, encounter for (Primary)  -     Mammo Screening Digital Tomosynthesis Bilateral With CAD; Future           I spent 22 minutes caring for Cora on this date of service. This time includes time spent by me in the following activities:preparing for the visit, reviewing tests, obtaining and/or reviewing a separately obtained history, performing a medically appropriate examination and/or evaluation  and documenting information in the medical record  Follow Up   Return in about 1 year (around 8/29/2023), or if symptoms worsen or fail to improve.     Continue annual screening mammography and breast exam unless concerns arise sooner.  Encouraged to continue routine self breast exams.    Patient was given instructions and counseling regarding her condition or for health maintenance advice. Please see specific information pulled into the AVS if appropriate.                 This document has been electronically signed by MAXIMO Mohamud on August 29, 2022 14:31 CDT

## 2022-12-19 RX ORDER — ALENDRONATE SODIUM 70 MG/1
TABLET ORAL
Qty: 12 TABLET | Refills: 4 | Status: SHIPPED | OUTPATIENT
Start: 2022-12-19

## 2023-12-19 NOTE — PROGRESS NOTES
CHIEF COMPLAINT:    Abnormal mammogram of left breast    HISTORY OF PRESENT ILLNESS:    Cora Burton is a 69 y.o. female who presents after an abnormal, BI-RADS 4 mammogram of the left breast.  She has never had a prior abnormal mammogram nor has she had prior breast biopsies or other procedures.  She notes no family history of breast cancer.  She notes no breast related changes or complaints.    Menarche was at age 15, menopause was at age 54.  She is .  She denies any prior hormone use.    Her recent mammogram showed 2 new clusters of calcifications in the left upper outer quadrant of the breast.    History reviewed. No pertinent past medical history.    Past Surgical History:   Procedure Laterality Date   • COLONOSCOPY N/A 2020    Procedure: COLONOSCOPY;  Surgeon: Brit Esparza MD;  Location: [facility] ENDOSCOPY;  Service: Gastroenterology;  Laterality: N/A;       Prior to Admission medications    Medication Sig Start Date End Date Taking? Authorizing Provider   alendronate (Fosamax) 70 MG tablet Take 1 tablet by mouth Every 7 (Seven) Days. 20  Yes Pamela Gray APRN   sodium-potassium-magnesium sulfates (Suprep Bowel Prep Kit) 17.5-3.13-1.6 GM/177ML solution oral solution Take 1 bottle by mouth Every 12 (Twelve) Hours. 20   Violeta Saxena APRN       No Known Allergies    Family History   Problem Relation Age of Onset   • Breast cancer Maternal Aunt        Social History     Socioeconomic History   • Marital status:      Spouse name: Not on file   • Number of children: Not on file   • Years of education: Not on file   • Highest education level: Not on file   Tobacco Use   • Smoking status: Never Smoker   • Smokeless tobacco: Never Used   Substance and Sexual Activity   • Alcohol use: Never     Frequency: Never   • Drug use: Never   • Sexual activity: Not Currently       Review of Systems   Constitutional: Negative for appetite change, chills, fever and unexpected weight change.  "  HENT: Negative for hearing loss, nosebleeds and trouble swallowing.    Eyes: Negative for visual disturbance.   Respiratory: Negative for apnea, cough, choking, chest tightness, shortness of breath, wheezing and stridor.    Cardiovascular: Negative for chest pain, palpitations and leg swelling.   Gastrointestinal: Negative for abdominal distention, abdominal pain, blood in stool, constipation, diarrhea, nausea and vomiting.   Endocrine: Negative for cold intolerance, heat intolerance, polydipsia, polyphagia and polyuria.   Genitourinary: Negative for difficulty urinating, dysuria, frequency, hematuria and urgency.   Musculoskeletal: Negative for arthralgias, back pain, myalgias and neck pain.   Skin: Negative for color change, pallor and rash.   Allergic/Immunologic: Negative for immunocompromised state.   Neurological: Negative for dizziness, seizures, syncope, light-headedness, numbness and headaches.   Hematological: Negative for adenopathy.   Psychiatric/Behavioral: Negative for suicidal ideas. The patient is not nervous/anxious.        Objective     /70   Pulse 74   Temp 97 °F (36.1 °C) (Temporal)   Ht 158.8 cm (62.5\")   Wt 77.2 kg (170 lb 3.2 oz)   BMI 30.63 kg/m²     Physical Exam   Constitutional: She is oriented to person, place, and time. She appears well-developed and well-nourished. No distress.   HENT:   Head: Normocephalic and atraumatic.   Eyes: Conjunctivae and EOM are normal. Right eye exhibits no discharge. Left eye exhibits no discharge. No scleral icterus.   Neck: No tracheal deviation present.   Cardiovascular: Normal rate and regular rhythm.   Pulmonary/Chest: Effort normal. No respiratory distress. Right breast exhibits no inverted nipple, no mass, no nipple discharge, no skin change and no tenderness. Left breast exhibits no inverted nipple, no mass, no nipple discharge, no skin change and no tenderness.   Lymphadenopathy:     She has no axillary adenopathy.        Right: No " supraclavicular adenopathy present.        Left: No supraclavicular adenopathy present.   Neurological: She is alert and oriented to person, place, and time.   Skin: Skin is warm. She is not diaphoretic.   Psychiatric: She has a normal mood and affect. Her behavior is normal. Judgment and thought content normal.       DIAGNOSTIC DATA:    Mammographic images were reviewed showing areas of calcification in the upper outer left breast for which biopsy was recommended by Dr. Edwards.    ASSESSMENT:    Abnormal mammogram of left breast    PLAN:    The risks and benefits of stereotactic left breast biopsy with clip placement were discussed and the patient is agreeable with proceeding.  This has been scheduled for 9/14/2020.          This document has been electronically signed by Sabas Lau MD on September 9, 2020 13:38       denies pain/discomfort (Rating = 0) rectal pain

## (undated) DEVICE — CANN SMPL SOFTECH BIFLO ETCO2 A/M 7FT